# Patient Record
Sex: FEMALE | Race: AMERICAN INDIAN OR ALASKA NATIVE
[De-identification: names, ages, dates, MRNs, and addresses within clinical notes are randomized per-mention and may not be internally consistent; named-entity substitution may affect disease eponyms.]

---

## 2018-11-15 ENCOUNTER — HOSPITAL ENCOUNTER (EMERGENCY)
Dept: HOSPITAL 42 - ED | Age: 18
Discharge: HOME | End: 2018-11-15
Payer: MEDICAID

## 2018-11-15 VITALS — TEMPERATURE: 98.2 F | RESPIRATION RATE: 19 BRPM | HEART RATE: 78 BPM

## 2018-11-15 VITALS — BODY MASS INDEX: 29.2 KG/M2

## 2018-11-15 VITALS — OXYGEN SATURATION: 98 % | SYSTOLIC BLOOD PRESSURE: 110 MMHG | DIASTOLIC BLOOD PRESSURE: 78 MMHG

## 2018-11-15 DIAGNOSIS — N83.201: Primary | ICD-10-CM

## 2018-11-15 LAB
ALBUMIN SERPL-MCNC: 4.1 G/DL (ref 3–4.8)
ALBUMIN/GLOB SERPL: 1 {RATIO} (ref 1.1–1.8)
ALT SERPL-CCNC: 42 U/L (ref 7–56)
APPEARANCE UR: CLEAR
AST SERPL-CCNC: 31 U/L (ref 14–36)
BACTERIA #/AREA URNS HPF: (no result) /[HPF]
BASOPHILS # BLD AUTO: 0.07 K/MM3 (ref 0–2)
BASOPHILS NFR BLD: 1.3 % (ref 0–3)
BILIRUB UR-MCNC: NEGATIVE MG/DL
BUN SERPL-MCNC: 10 MG/DL (ref 7–21)
CALCIUM SERPL-MCNC: 9.2 MG/DL (ref 8.4–10.5)
COLOR UR: YELLOW
EOSINOPHIL # BLD: 0.2 10*3/UL (ref 0–0.7)
EOSINOPHIL NFR BLD: 3.7 % (ref 1.5–5)
EPI CELLS #/AREA URNS HPF: (no result) /HPF (ref 0–5)
ERYTHROCYTE [DISTWIDTH] IN BLOOD BY AUTOMATED COUNT: 13.1 % (ref 11.5–14.5)
GFR NON-AFRICAN AMERICAN: > 60
GLUCOSE UR STRIP-MCNC: NEGATIVE MG/DL
GRANULOCYTES # BLD: 3.25 10*3/UL (ref 1.4–6.5)
GRANULOCYTES NFR BLD: 59.7 % (ref 50–68)
HGB BLD-MCNC: 13.6 G/DL (ref 12–16)
LEUKOCYTE ESTERASE UR-ACNC: NEGATIVE LEU/UL
LYMPHOCYTES # BLD: 1.6 10*3/UL (ref 1.2–3.4)
LYMPHOCYTES NFR BLD AUTO: 28.7 % (ref 22–35)
MCH RBC QN AUTO: 29.1 PG (ref 25–35)
MCHC RBC AUTO-ENTMCNC: 33.2 G/DL (ref 31–37)
MCV RBC AUTO: 87.8 FL (ref 80–105)
MONOCYTES # BLD AUTO: 0.4 10*3/UL (ref 0.1–0.6)
MONOCYTES NFR BLD: 6.6 % (ref 1–6)
PH UR STRIP: 7 [PH] (ref 4.7–8)
PLATELET # BLD: 196 10^3/UL (ref 120–450)
PMV BLD AUTO: 9.6 FL (ref 7–11)
PROT UR STRIP-MCNC: 30 MG/DL
RBC # BLD AUTO: 4.67 10^6/UL (ref 3.5–6.1)
RBC # UR STRIP: NEGATIVE /UL
SP GR UR STRIP: 1.02 (ref 1–1.03)
UROBILINOGEN UR STRIP-ACNC: 0.2 E.U./DL
WBC # BLD AUTO: 5.4 10^3/UL (ref 4.5–11)

## 2018-11-15 PROCEDURE — 85025 COMPLETE CBC W/AUTO DIFF WBC: CPT

## 2018-11-15 PROCEDURE — 93005 ELECTROCARDIOGRAM TRACING: CPT

## 2018-11-15 PROCEDURE — 87491 CHLMYD TRACH DNA AMP PROBE: CPT

## 2018-11-15 PROCEDURE — 80053 COMPREHEN METABOLIC PANEL: CPT

## 2018-11-15 PROCEDURE — 76830 TRANSVAGINAL US NON-OB: CPT

## 2018-11-15 PROCEDURE — 96374 THER/PROPH/DIAG INJ IV PUSH: CPT

## 2018-11-15 PROCEDURE — 81001 URINALYSIS AUTO W/SCOPE: CPT

## 2018-11-15 PROCEDURE — 96375 TX/PRO/DX INJ NEW DRUG ADDON: CPT

## 2018-11-15 PROCEDURE — 87591 N.GONORRHOEAE DNA AMP PROB: CPT

## 2018-11-15 PROCEDURE — 87086 URINE CULTURE/COLONY COUNT: CPT

## 2018-11-15 PROCEDURE — 96372 THER/PROPH/DIAG INJ SC/IM: CPT

## 2018-11-15 PROCEDURE — 99283 EMERGENCY DEPT VISIT LOW MDM: CPT

## 2018-11-15 NOTE — CARD
--------------- APPROVED REPORT --------------





Date of service: 11/15/2018



EKG Measurement

Heart [id number]

HI [id number]

[id number]

[id number]

[id number]



<Conclusion>

Normal sinus rhythm with sinus arrhythmia

Normal ECG

## 2018-11-15 NOTE — ED PDOC
Arrival/HPI





- General


Historian: Patient





- History of Present Illness


Narrative History of Present Illness (Text): 





11/15/18 09:02


18 year old female with a past medical history of dermoid cyst who comes in 

today complaining of suprapubic pain for the past couple of days. Patient 

reports some radiation to the left quadrant.  She rates it a 6/10 in severity.  

Patient also reports a fever with a Tmax of 100.6 F at home. Patient reports 

taking Ibuprofen with some improvement.  She also admits to lightheadedness and 

some dizziness.  Patient denies any chest pain, nausea, vomiting, headaches, 

syncopal episodes, or any other complaints.





PMD: Dr. Borja





Medical history: Dermoid cyst


Medications: Denies


Allergies: Denies


Surgical history: Denies


Time/Duration: < week


Symptom Onset: Gradual


Symptom Course: Unchanged


Quality: Stabbing


Severity Level: 6


Activities at Onset: Rest


Context: Sitting





<Maldonado Green - Last Filed: 11/15/18 12:23>





Past Medical History





- Provider Review


Nursing Documentation Reviewed: Yes





- Travel History


Have you recently traveled outside [place] w/in the past 3 mons?: No





- Past History


Past History: No Previous





- Infectious Disease


Hx of Infectious Diseases: None





- Tetanus Immunization


Tetanus Immunization: Unknown





- Psychiatric


Hx Depression: No


Hx Emotional Abuse: No


Hx Physical Abuse: No


Hx Substance Use: No





- Past Surgical History


Past Surgical History: No Previous





- Anesthesia


Hx Anesthesia: No


Hx Anesthesia Reactions: No


Hx Malignant Hyperthermia: No





- Suicidal Assessment


Feels Threatened In Home Enviroment: No





<Maldonado Green - Last Filed: 11/15/18 12:23>





Family/Social History





- Physician Review


Nursing Documentation Reviewed: Yes


Family/Social History: No Known Family HX


Smoking Status: Never Smoked


Hx Alcohol Use: No


Hx Substance Use: No





<Maldonado Green - Last Filed: 11/15/18 12:23>





Allergies/Home Meds





<Maldonado Green - Last Filed: 11/15/18 12:23>





<Ronald Livingston - Last Filed: 11/15/18 12:51>


Allergies/Adverse Reactions: 


Allergies





No Known Allergies Allergy (Verified 04/13/17 00:58)


   











Review of Systems





- Physician Review


All systems were reviewed & negative as marked: Yes





- Review of Systems


Constitutional: Fevers (Tmax 100.6).  absent: Fatigue, Weight Change


Eyes: Normal.  absent: Vision Changes, Photophobia


ENT: Normal.  absent: Hearing Changes, Tinnitus, Rhinorrhea


Respiratory: Normal.  absent: SOB, Cough, Sputum


Cardiovascular: Normal.  absent: Chest Pain, Palpitations, Syncope


Gastrointestinal: Abdominal Pain (Suprapubic pain).  absent: Constipation, 

Diarrhea, Nausea, Vomiting, Appetite Changes, Food Intolerance


Musculoskeletal: Normal.  absent: Arthralgias, Back Pain, Neck Pain


Skin: Normal.  absent: Rash, Pruritis, Skin Lesions


Neurological: Normal.  absent: Headache, Dizziness, Facial Droop


Endocrine: Normal.  absent: Diaphoresis, Polyuria, Polydipsia


Hemo/Lymphatic: Normal.  absent: Adenopathy, Easy Bleeding


Psychiatric: Normal.  absent: Anxiety, Depression





<Maldonado Green - Last Filed: 11/15/18 12:23>





Physical Exam


Vital Signs Reviewed: Yes





Vital Signs











  Temp Pulse Resp BP Pulse Ox


 


 11/15/18 08:28  98.9 F  106 H  18  110/78  98











Temperature: Afebrile


Blood Pressure: Normal


Pulse: Tachycardic


Respiratory Rate: Normal


Appearance: Positive for: Well-Appearing, Non-Toxic, Comfortable.  No: Ill-Appe

aring


Pain Distress: Mild


Mental Status: Positive for: Alert and Oriented X 3.  No: Confused, Agitated





- Systems Exam


Head: Present: Atraumatic, Normocephalic.  No: Tenderness, Abrasion, Laceration


Pupils: Present: PERRL.  No: Sluggish, Non-Reactive


Extroacular Muscles: Present: EOMI.  No: Gaze Palsy


Conjunctiva: Present: Normal.  No: Injected


Mouth: Present: Moist Mucous Membranes.  No: Dry, Normal Teeth


Neck: Present: Normal Range of Motion.  No: Meningeal Signs, JVD


Respiratory/Chest: Present: Clear to Auscultation, Good Air Exchange.  No: 

Wheezes, Tachypneic


Cardiovascular: Present: Regular Rate and Rhythm, Normal S1, S2, Tachycardic


Abdomen: Present: Tenderness (Suprapubic area), Normal Bowel Sounds.  No: 

McBurney's Point Tender, Ostomy Tubes


Upper Extremity: Present: Normal Inspection.  No: Cyanosis, Edema, Swelling, 

Erythema


Lower Extremity: Present: Normal Inspection.  No: Cihlango's Sign


Skin: Present: Dry, Normal Color.  No: Pale


Lymphatic: No: Cervical Adenopathy, Axillary Adenopathy, Inguinal Adenopathy


Psychiatric: Present: Alert, Oriented x 3, Normal Insight





<Maldonado Green - Last Filed: 11/15/18 12:23>





Vital Signs











  Temp Pulse Resp BP Pulse Ox


 


 11/15/18 08:28  98.9 F  106 H  18  110/78  98














<Ronald Livingston - Last Filed: 11/15/18 12:51>





Medical Decision Making


ED Course and Treatment: 





11/15/18 09:12


18 year old female with a past medical history of dermoid cyst presents 

complaining of fatigue and suprapubic pain.





Ovarian torsion vs. Ruptured dermoid cyst vs. Sypmtomatic anemia





Plan:


CBC


CMP


U/A


Urine cx


GC/Chylamydia


Transvaginal u/s


IV Toradol


IV Fluids





Transvaginal u/s


-negative for torsion


-positive for 8cm dermoid cyst, stable





11/15/18 12:23


PO Azithromycin 


IM Ceftriaxone administered





Patient reports improvement in symptoms. Stable for discharge.





- Medication Orders


Current Medication Orders: 











Sodium Chloride (Sodium Chloride 0.9%)  500 mls @ 999 mls/hr IV .Q31M STA


   Stop: 11/15/18 09:20


Ketorolac Tromethamine (Toradol)  15 mg IVP STAT STA


   Stop: 11/15/18 08:57











<Maldonado Green - Last Filed: 11/15/18 12:23>


ED Course and Treatment: 





Patient Seen with Resident:


In agreement with resident note which contains more details about the patient. 

Patient seen and evaluated with resident. Came up with plan and treatment 

together.


18 year old female presents complaining suprapubic pain for the past couple days

that radiates to the left quadrant associated with a fever.


Plan: 


-- EKG 


-- Lab


-- Rocephin, IV Fluids, Toradol, Zithromax 


-- Urine Culture


-- POC Pregnancy Test


-- Urinalysis w/ micro


-- Reassess and disposition 








- Lab Interpretations


Lab Results: 











                                 11/15/18 09:45 





                                 11/15/18 09:45 





                                   Lab Results





11/15/18 09:45: Sodium 138, Potassium 5.0, Chloride 104, Carbon Dioxide 27, 

Anion Gap 12, BUN 10, Creatinine 0.9, Est GFR (African Amer) > 60, Est GFR (Non-

Af Amer) > 60, Random Glucose 90, Calcium 9.2, Total Bilirubin 0.4, AST 31, ALT 

42, Alkaline Phosphatase 78, Total Protein 8.0, Albumin 4.1, Globulin 3.9, 

Albumin/Globulin Ratio 1.0 L


11/15/18 09:45: WBC 5.4, RBC 4.67, Hgb 13.6, Hct 41.0, MCV 87.8, MCH 29.1, MCHC 

33.2, RDW 13.1, Plt Count 196, MPV 9.6, Gran % 59.7, Lymph % (Auto) 28.7, Mono %

(Auto) 6.6 H, Eos % (Auto) 3.7, Baso % (Auto) 1.3, Gran # 3.25, Lymph # (Auto) 

1.6, Mono # (Auto) 0.4, Eos # (Auto) 0.2, Baso # (Auto) 0.07


11/15/18 09:20: Urine Color Yellow, Urine Appearance Clear, Urine pH 7.0, Ur 

Specific Gravity 1.025, Urine Protein 30 H, Urine Glucose (UA) Negative, Urine 

Ketones Negative, Urine Blood Negative, Urine Nitrate Negative, Urine Bilirubin 

Negative, Urine Urobilinogen 0.2, Ur Leukocyte Esterase Negative, Urine RBC 1 - 

3, Urine WBC 2 - 5, Ur Epithelial Cells Many, Urine Bacteria Many, Urine Other 

Uyeast











- RAD Interpretation


Radiology Orders: 











11/15/18 10:23


TRANSVAGINAL [US] Stat 














- Medication Orders


Current Medication Orders: 














Discontinued Medications





Azithromycin (Zithromax)  1,000 mg PO STAT STA; Protocol


   Stop: 11/15/18 10:07


   Last Admin: 11/15/18 10:26  Dose: 1,000 mg





Ceftriaxone Sodium (Rocephin)  250 mg IM STAT STA; Protocol


   Stop: 11/15/18 10:06


   Last Admin: 11/15/18 10:26  Dose: 250 mg





IM Administration Charges


 Document     11/15/18 10:26  [email or web address]  (Rec: 11/15/18 10:26  [email or web address]  [id number])


     Injection Site


      MAR Injection Site                         Right Gluteus Jett


     Charges for Administration


      # of IM Administrations                    1





Sodium Chloride (Sodium Chloride 0.9%)  500 mls @ 999 mls/hr IV .Q31M STA


   Stop: 11/15/18 09:20


   Last Admin: 11/15/18 09:44  Dose: 999 mls/hr





eMAR Start Stop


 Document     11/15/18 09:44  CASTS1  (Rec: 11/15/18 09:44  CASTS1  [id number])


     Intravenous Solution


      Start Date                                 11/15/18


      Start Time                                 09:44





Ketorolac Tromethamine (Toradol)  15 mg IVP STAT STA


   Stop: 11/15/18 08:57


   Last Admin: 11/15/18 09:43  Dose: 15 mg





MAR Pain Assessment


 Document     11/15/18 09:43  CASTS1  (Rec: 11/15/18 09:43  CAST  [id number])


     Pain Reassessment


      Is this a pain reassessment?               No


     Sleep


      Is patient sleeping during reassessment?   No


     Presence of Pain


      Presence of Pain                           Yes


     Pain Scale Used


     Protocol:  PSCALES


      Pain Scale Used                            Numeric


     Location


      Pain Location Body Site                    Generalized


     Description


      Description                                Constant


      Intensity of Pain at present               5


      Pain Behavior                              Facial Grimacing


      Aggravating Factors                        Changing Position


      Alleviating Factors/Management             Medication


       Techniques                                


      Alleviating Factors                        Medication


IVP Administration


 Document     11/15/18 09:43  [email or web address]  (Rec: 11/15/18 09:43  [facility]  [id number])


     Charges for Administration


      # of IVP Administrations                   1














<Ronald Livingston - Last Filed: 11/15/18 12:51>





- Scribe Statement


The provider has reviewed the documentation as recorded by the Venita tSuart





Provider Scribe Attestation:


All medical record entries made by the Scribe were at my direction and 

personally dictated by me. I have reviewed the chart and agree that the record 

accurately reflects my personal performance of the history, physical exam, 

medical decision making, and the department course for this patient. I have also

 personally directed, reviewed, and agree with the discharge instructions and 

disposition.








<Ronald Livingston - Last Filed: 11/15/18 12:51>





Disposition/Present on Arrival





- Present on Arrival


Any Indicators Present on Arrival: No


History of DVT/PE: No


History of Uncontrolled Diabetes: No


Urinary Catheter: No


History of Decub. Ulcer: No


History Surgical Site Infection Following: None





- Disposition


Have Diagnosis and Disposition been Completed?: Yes


Disposition Time: 12:15


Patient Plan: Discharge





<Maldonado Green - Last Filed: 11/15/18 12:23>





<Ronald Livingston - Last Filed: 11/15/18 12:51>





- Disposition


Diagnosis: 


 Ovarian cyst





Disposition: HOME/ ROUTINE


Condition: IMPROVED


Additional Instructions: 


1. F/u with PMD within 2 days upon discharge.


2. F/u with OB/GYN within 2 days upon discharge.


3. Return to hospital for any new or worsening symptoms.


Referrals: 


Ivon Nichole MD [Primary Care Provider] - Follow up with primary


Tashia Graves MD [Non-Staff] - Follow up with primary

## 2018-11-15 NOTE — US
Date of service: 



11/15/2018



HISTORY:

hx of dermoid cyst



COMPARISON:

None available.



TECHNIQUE:

Transvaginal ultrasound was performed in longitudinal and transverse 

projections with limited transabdominal technique utilized as well.



FINDINGS:



UTERUS:

Measures 6.4 x 3.1 x 4.5 cm. Normal in size and appearance, 

anteverted.  No fibroid or other mass lesion seen.



ENDOMETRIUM:

Measures 9.2 mm in diameter. Unremarkable. 



CERVIX:

Small nabothian cyst is identified superiorly, anteriorly.  Remainder 

the cervix appears unremarkable.



RIGHT OVARY:

Measures 8.0 x 4.6 x 6.6 cm. The right ovary is enlarged by complex 

heterogeneous lesion containing a moderate amount of hyperechoic 

tissue as well as hypoechoic tissue.  There is very low little 

intermediate echogenicity soft tissue present within it.  The overall 

pattern would be concordant with patient's reported prior diagnosis 

of dermoid lesion.  There is no prior comparison available at this 

time and clinical correlation is advised.  Intra-ovarian arterial 

blood flow is appreciated.  No torsion pattern demonstrated grossly.  



LEFT OVARY:

Measures 4.2 x 2.5 x 3.9 cm. No solid mass. Normal flow. Small cyst 

suggestive of follicles are scattered in the left ovary.



FREE FLUID:

No significant free fluid noted.



OTHER FINDINGS:

None. 



IMPRESSION:

1. A complex mass measuring 8.0 cm greatest dimension is identified 

at the right adnexal compartment with the right ovary not identified 

independent of this structure.  Overall appearance suggests dermoid 

lesion, particularly the hyperechoic components in the lesion.  A 

prior imaging can be retrieved for comparison, then an addendum to 

this report can be provided. 



2. The remainder of the examination appears unremarkable.

## 2018-11-24 ENCOUNTER — HOSPITAL ENCOUNTER (EMERGENCY)
Dept: HOSPITAL 42 - ED | Age: 18
Discharge: HOME | End: 2018-11-24
Payer: MEDICAID

## 2018-11-24 VITALS
OXYGEN SATURATION: 100 % | HEART RATE: 90 BPM | RESPIRATION RATE: 16 BRPM | TEMPERATURE: 99.1 F | SYSTOLIC BLOOD PRESSURE: 140 MMHG | DIASTOLIC BLOOD PRESSURE: 79 MMHG

## 2018-11-24 VITALS — BODY MASS INDEX: 29.2 KG/M2

## 2018-11-24 DIAGNOSIS — N39.0: Primary | ICD-10-CM

## 2018-11-24 LAB
ALBUMIN SERPL-MCNC: 3.2 G/DL (ref 3–4.8)
ALBUMIN/GLOB SERPL: 0.9 {RATIO} (ref 1.1–1.8)
ALT SERPL-CCNC: 117 U/L (ref 7–56)
AMORPH SED URNS QL MICRO: (no result)
APPEARANCE UR: (no result)
AST SERPL-CCNC: 90 U/L (ref 14–36)
BACTERIA #/AREA URNS HPF: (no result) /[HPF]
BASOPHILS # BLD AUTO: 0.28 K/MM3 (ref 0–2)
BASOPHILS NFR BLD: 2.8 % (ref 0–3)
BILIRUB UR-MCNC: (no result) MG/DL
BUN SERPL-MCNC: 12 MG/DL (ref 7–21)
CALCIUM SERPL-MCNC: 8.2 MG/DL (ref 8.4–10.5)
COLOR UR: YELLOW
EOSINOPHIL # BLD: 0 10*3/UL (ref 0–0.7)
EOSINOPHIL NFR BLD: 0.3 % (ref 1.5–5)
ERYTHROCYTE [DISTWIDTH] IN BLOOD BY AUTOMATED COUNT: 13.3 % (ref 11.5–14.5)
GFR NON-AFRICAN AMERICAN: > 60
GLUCOSE UR STRIP-MCNC: NEGATIVE MG/DL
GRANULOCYTES # BLD: 3.72 10*3/UL (ref 1.4–6.5)
GRANULOCYTES NFR BLD: 37 % (ref 50–68)
HCG,QUALITATIVE URINE: NEGATIVE
HGB BLD-MCNC: 13.4 G/DL (ref 12–16)
HGB OTHER MFR BLD ELPH: (no result) /HPF (ref 0–2)
LEUKOCYTE ESTERASE UR-ACNC: NEGATIVE LEU/UL
LIPASE SERPL-CCNC: 50 U/L (ref 23–300)
LYMPHOCYTES # BLD: 5.3 10*3/UL (ref 1.2–3.4)
LYMPHOCYTES NFR BLD AUTO: 52.3 % (ref 22–35)
MCH RBC QN AUTO: 28.8 PG (ref 25–35)
MCHC RBC AUTO-ENTMCNC: 33.8 G/DL (ref 31–37)
MCV RBC AUTO: 85.2 FL (ref 80–105)
MONOCYTES # BLD AUTO: 0.8 10*3/UL (ref 0.1–0.6)
MONOCYTES NFR BLD: 7.6 % (ref 1–6)
PH UR STRIP: 6.5 [PH] (ref 4.7–8)
PLATELET # BLD: 170 10^3/UL (ref 120–450)
PMV BLD AUTO: 9.8 FL (ref 7–11)
PROT UR STRIP-MCNC: >=300 MG/DL
RBC # BLD AUTO: 4.66 10^6/UL (ref 3.5–6.1)
RBC # UR STRIP: (no result) /UL
RBC #/AREA URNS HPF: (no result) /HPF (ref 0–2)
SP GR UR STRIP: 1.02 (ref 1–1.03)
URINE FINE GRANULAR CAST: (no result) /HPF (ref 0–2)
URINE HYALINE CAST: (no result) /HPF
UROBILINOGEN UR STRIP-ACNC: 0.2 E.U./DL
WBC # BLD AUTO: 10 10^3/UL (ref 4.5–11)

## 2018-11-24 PROCEDURE — 71045 X-RAY EXAM CHEST 1 VIEW: CPT

## 2018-11-24 PROCEDURE — 81001 URINALYSIS AUTO W/SCOPE: CPT

## 2018-11-24 PROCEDURE — 85025 COMPLETE CBC W/AUTO DIFF WBC: CPT

## 2018-11-24 PROCEDURE — 84703 CHORIONIC GONADOTROPIN ASSAY: CPT

## 2018-11-24 PROCEDURE — 83690 ASSAY OF LIPASE: CPT

## 2018-11-24 PROCEDURE — 96361 HYDRATE IV INFUSION ADD-ON: CPT

## 2018-11-24 PROCEDURE — 99282 EMERGENCY DEPT VISIT SF MDM: CPT

## 2018-11-24 PROCEDURE — 80053 COMPREHEN METABOLIC PANEL: CPT

## 2018-11-24 PROCEDURE — 87086 URINE CULTURE/COLONY COUNT: CPT

## 2018-11-24 PROCEDURE — 96374 THER/PROPH/DIAG INJ IV PUSH: CPT

## 2018-11-24 NOTE — RAD
HISTORY:

 fever 



COMPARISON:

No prior. 



TECHNIQUE:

Chest, one view.



FINDINGS:

Examination limited by habitus. 



LUNGS:

No focal consolidation.



Please note that chest x-ray has limited sensitivity for the 

detection of pulmonary masses.



PLEURA:

No significant pleural effusion identified. No definite pneumothorax .



CARDIOVASCULAR:

Heart size appears within normal limits.  No significant 

atherosclerotic calcification present. 



OSSEOUS STRUCTURES:

No acute osseous abnormality identified.



VISUALIZED UPPER ABDOMEN:

Unremarkable.



OTHER FINDINGS:

None.



IMPRESSION:

No focal consolidation identified.

## 2018-11-24 NOTE — ED PDOC
Arrival/HPI





- General


Chief Complaint: Cough, Cold, Congestion


Time Seen by Provider: 11/24/18 12:43


Historian: Patient





- History of Present Illness


Narrative History of Present Illness (Text): 





11/24/18 14:03


A 18 year old female with no significant past medical history presents to the 

emergency department complaining of a fever since 2 weeks ago. Patient notes she

has been symptomatic since November 10. Patient reports her body temperature at 

home measured to be 100.4 degrees and has constantly been of similar temperature

for the last 2 weeks. Patient reports experiencing associated chills, lower back

pain, dark urine, diaphoresis, and nausea. Patient denies any recent travel, 

sick people contact, chest pain, cough, vomiting, shortness of breath, neck 

pain, headache, dizziness, or any other complaints. 





Time/Duration: > week (2 weeks)


Symptom Onset: Gradual


Symptom Course: Unchanged


Activities at Onset: Light


Context: Home





Past Medical History





- Provider Review


Nursing Documentation Reviewed: Yes





- Past History


Past History: No Previous





- Infectious Disease


Hx of Infectious Diseases: None





- Tetanus Immunization


Tetanus Immunization: Unknown





- Psychiatric


Hx Depression: No


Hx Emotional Abuse: No


Hx Physical Abuse: No


Hx Substance Use: No





- Past Surgical History


Past Surgical History: No Previous





- Anesthesia


Hx Anesthesia: No


Hx Anesthesia Reactions: No


Hx Malignant Hyperthermia: No





- Suicidal Assessment


Feels Threatened In Home Enviroment: No





Family/Social History





- Physician Review


Nursing Documentation Reviewed: Yes


Family/Social History: No Known Family HX


Smoking Status: Never Smoked


Hx Alcohol Use: No


Hx Substance Use: No





Allergies/Home Meds


Allergies/Adverse Reactions: 


Allergies





No Known Allergies Allergy (Verified 04/13/17 00:58)


   











Review of Systems





- Physician Review


All systems were reviewed & negative as marked: Yes





- Review of Systems


Respiratory: absent: Cough


Cardiovascular: absent: Chest Pain





Physical Exam





- Physical Exam


Narrative Physical Exam (Text): 





11/24/18 14:04


Constitutional: No acute distress.


Head: Normocephalic.  Atraumatic.  


Eyes:  PERRL.


ENT:  Moist mucous membranes.


Neck:  Supple.


Cardiovascular:  Regular rate.


Chest: No tenderness.


Respiratory:  Clear to auscultation bilaterally.


GI:  Soft. Nontender. Nondistended. 


Back:  Mild bilateral CVA tenderness.


Musculoskeletal:  No tenderness or swelling of extremities.


Skin:  No rash. 


Neurologic:  Alert, no focal deficit.





Vital Signs Reviewed: Yes





Vital Signs











  Temp Pulse Resp BP Pulse Ox


 


 11/24/18 13:36  99 F  103 H  18  123/66  100











Temperature: Febrile


Blood Pressure: Normal


Pulse: Regular


Respiratory Rate: Normal


Appearance: Positive for: Well-Appearing, Non-Toxic, Comfortable





Medical Decision Making


ED Course and Treatment: 





11/24/18 14:04


Impression: 18 year old female presenting with fever.





Plan:


-- Labs


-- CBC


-- Chest X-ray 


-- Zofran Injection


-- IV fluids


-- Urine culture


-- HCG, urine stat


-- Urinalysis 


-- Reassess and disposition





Prior Visits:


Notes and results from previous visits were reviewed. 





Progress Notes:





11/24/18 15:03


Procedure: Chest X-ray 


Impression: No focal consolidation identified.


Dictator: Yanelis Galindo MD





Will treat for UTI. Advised follow up with PMD but to return to ED for worsening

symptoms including fever, chills, pain, vomiting, dyspnea. 





- Scribe Statement


The provider has reviewed the documentation as recorded by the Venita Moon





All medical record entries made by the Scribe were at my direction and 

personally dictated by me. I have reviewed the chart and agree that the record 

accurately reflects my personal performance of the history, physical exam, 

medical decision making, and the department course for this patient. I have also

personally directed, reviewed, and agree with the discharge instructions and 

disposition.








Disposition/Present on Arrival





- Present on Arrival


Any Indicators Present on Arrival: No


History of DVT/PE: No


History of Uncontrolled Diabetes: No


Urinary Catheter: No


History of Decub. Ulcer: No


History Surgical Site Infection Following: None





- Disposition


Have Diagnosis and Disposition been Completed?: Yes


Diagnosis: 


 UTI (urinary tract infection)





Disposition: HOME/ ROUTINE


Disposition Time: 16:21


Patient Plan: Discharge


Patient Problems: 


                             Current Active Problems











Problem Status Onset


 


UTI (urinary tract infection) Acute 











Condition: STABLE


Discharge Instructions (ExitCare):  Urinary Tract Infections in Adults


Prescriptions: 


Ciprofloxacin [Cipro] 500 mg PO BID #14 tab


Forms:  [employer] (English)